# Patient Record
Sex: MALE | Race: WHITE | Employment: FULL TIME | URBAN - METROPOLITAN AREA
[De-identification: names, ages, dates, MRNs, and addresses within clinical notes are randomized per-mention and may not be internally consistent; named-entity substitution may affect disease eponyms.]

---

## 2020-07-07 ENCOUNTER — VIRTUAL VISIT (OUTPATIENT)
Dept: FAMILY MEDICINE CLINIC | Age: 35
End: 2020-07-07

## 2020-07-07 DIAGNOSIS — Z13.29 SCREENING FOR THYROID DISORDER: ICD-10-CM

## 2020-07-07 DIAGNOSIS — R35.0 FREQUENT URINATION: ICD-10-CM

## 2020-07-07 DIAGNOSIS — Z00.00 ENCOUNTER FOR MEDICAL EXAMINATION TO ESTABLISH CARE: Primary | ICD-10-CM

## 2020-07-07 DIAGNOSIS — N52.9 ERECTILE DYSFUNCTION, UNSPECIFIED ERECTILE DYSFUNCTION TYPE: ICD-10-CM

## 2020-07-07 DIAGNOSIS — Z13.220 SCREENING, LIPID: ICD-10-CM

## 2020-07-07 DIAGNOSIS — M51.36 BULGING LUMBAR DISC: ICD-10-CM

## 2020-07-07 NOTE — PROGRESS NOTES
Requested last consultation note. Labs faxed to [address] [place] [address]. [facility], confirmation received.

## 2020-07-07 NOTE — PROGRESS NOTES
Subjective:     Chief Complaint   Patient presents with   Tal Adrian is a 37 y.o. male who was seen by synchronous (real-time) audio-video technology on 7/7/2020 for Establish Care      Previous PCP-Christopher a long time ago. Says he usually does not go to the doctor, only goes when he has to. Works full time installing hardwood floors. Complains of erectile dysfunction for \"years, I dont think its ever really worked like it was suppose to but it really got bad over the past 5 years\". He says him and his wife were split up for about a year and now they are back together but he does not have any sexual desire, which he says has caused some problems in his marriage. Says that he has hard time getting and maintaining erection. No difficulty urinating. No loss of bladder control. No testicular pain. No penile discharge. Sometimes he feels that he urinates often. He says that he does have two children. He has never been seen for this ED, too embarrassed to discuss in the past.    He was seen by a urologist for testicular pain last year after he went to ER at [facility] and had imaging done, says that he went to the urologist who reviewed his imaging and told him that his testicular pain was due to his bulging discs in his back and told to go to neuro-surgeon or back specialist. Corey Sibley he never went to back doctor because he stopped heavy lifting and his groin pain resolved. He says that he was seen by [facility] but he also admits that he did not tell the urologist about his ongoing chronic ED. He denies history of STD  He denies back pain at this time. He notes that he did have colonoscopy about 3 years ago because he was having rectal bleeding. Diagnosed with internal and external hemorrhoids. Told to stop lifting heavy.           Patient Active Problem List    Diagnosis Date Noted    Erectile dysfunction 07/07/2020    Bulging lumbar disc 07/07/2020 Allergies   Allergen Reactions    Erythromycin Unknown (comments)    Pcn [Penicillins] Unknown (comments)     Past Medical History:   Diagnosis Date    Bulging lumbar disc 7/7/2020    Erectile dysfunction 7/7/2020     History reviewed. No pertinent surgical history.   Family History   Family history unknown: Yes     Social History     Tobacco Use    Smoking status: Current Every Day Smoker     Packs/day: 1.50     Years: 20.00     Pack years: 30.00     Types: Cigarettes    Smokeless tobacco: Never Used   Substance Use Topics    Alcohol use: Not Currently       ROS  Gen: no fatigue, fever, chills  Eyes: no excessive tearing, itching, or discharge  Nose: no rhinorrhea, no sinus pain  Mouth: no oral lesions, no sore throat  Resp: no shortness of breath, no wheezing, no cough  CV: no chest pain, no paroxysmal nocturnal dyspnea  Abd: no nausea, no heartburn, no diarrhea, no constipation, no abdominal pain  Neuro: no headaches, no syncope or presyncopal episodes  Endo: no polyuria, no polydipsia  Heme: no lymphadenopathy, no easy bruising or bleeding    Objective:     Patient-Reported Vitals 7/7/2020   Patient-Reported Weight 198 lbs   Patient-Reported Height 6 ft         [INSTRUCTIONS:  \"[x]\" Indicates a positive item  \"[]\" Indicates a negative item  -- DELETE ALL ITEMS NOT EXAMINED]    Constitutional: [x] Appears well-developed and well-nourished [x] No apparent distress          Mental status: [x] Alert and awake  [x] Oriented to person/place/time [x] Able to follow commands        Eyes:   EOM    [x]  Normal       Sclera  [x]  Normal              Discharge [x]  None visible       HENT: [x] Normocephalic, atraumatic     [x] Mouth/Throat: Mucous membranes are moist    External Ears [x] Normal      Neck: [x] No visualized mass     Pulmonary/Chest: [x] Respiratory effort normal   [x] No visualized signs of difficulty breathing or respiratory distress          Musculoskeletal:   [x] Normal gait with no signs of ataxia         [x] Normal range of motion of neck            Neurological:        [x] No Facial Asymmetry (Cranial nerve 7 motor function) (limited exam due to video visit)          [x] No gaze palsy           Skin:        [x] No significant exanthematous lesions or discoloration noted on facial skin               Psychiatric:       [x] Normal Affect        [x] No Hallucinations        Assessment & Plan:   Diagnoses and all orders for this visit:    1. Encounter for medical examination to establish care    2. Erectile dysfunction, unspecified erectile dysfunction type- will get labs. Consider referral to endocrine as well. Refer to neurosurgery. Will try to get records from [place] [facility] from last year appointment.   -     METABOLIC PANEL, COMPREHENSIVE; Future  -     CBC WITH AUTOMATED DIFF; Future  -     TSH 3RD GENERATION; Future  -     T4, FREE; Future  -     URINALYSIS W/ REFLEX CULTURE; Future  -     TESTOSTERONE, FREE & TOTAL; Future  -     CT/NG/T.VAGINALIS AMPLIFICATION; Future  -     REFERRAL TO NEUROSURGERY  -     HEMOGLOBIN A1C WITH EAG; Future  -     [address] AND ; Future    3. Bulging lumbar disc-no current pain but ED could be related to nerve issue.    -     REFERRAL TO NEUROSURGERY    4. Screening for thyroid disorder  -     TSH 3RD GENERATION; Future  -     T4, FREE; Future    5. Screening, lipid  -     LIPID PANEL; Future    6. Frequent urination- intermittent. Will try to get records from urology and will get UA with labs. -     HEMOGLOBIN A1C WITH EAG; Future    Smoking cessation discussed and encouraged. Follow up one month or sooner if needed. We discussed the expected course, resolution and complications of the diagnosis(es) in detail. Medication risks, benefits, costs, interactions, and alternatives were discussed as indicated. I advised him to contact the office if his condition worsens, changes or fails to improve as anticipated.  He expressed understanding with the diagnosis(es) and plan.       Kely Martínez, who was evaluated through a patient-initiated, synchronous (real-time) audio-video encounter, and/or his healthcare decision maker, is aware that it is a billable service, with coverage as determined by his insurance carrier. He provided verbal consent to proceed: Yes, and patient identification was verified. It was conducted pursuant to the emergency declaration under the [address] [place] [address] and the [employer]. A caregiver was present when appropriate. Ability to conduct physical exam was limited. I was at home. The patient was at home. Doxy. me used for this visit.     Teresita Laboy NP

## 2020-07-07 NOTE — PROGRESS NOTES
Chief Complaint   Patient presents with   [facility] Maintenance reviewed     1. Have you been to the ER, urgent care clinic since your last visit? Hospitalized since your last visit? No     2. Have you seen or consulted any other health care providers outside of the [address] [place] [address] since your last visit? Include any pap smears or colon screening.   No

## 2020-07-07 NOTE — Clinical Note
Can you fax lab orders to [address] please. Also- patient was seen at [facility] last year, does not remember name of MD he saw but can we try to get copy of that consultation please.   Thanks

## 2020-07-11 LAB
ALBUMIN SERPL-MCNC: 4.7 G/DL (ref 4–5)
ALBUMIN/GLOB SERPL: 2.1 {RATIO} (ref 1.2–2.2)
ALP SERPL-CCNC: 76 IU/L (ref 39–117)
ALT SERPL-CCNC: 23 IU/L (ref 0–44)
APPEARANCE UR: CLEAR
AST SERPL-CCNC: 29 IU/L (ref 0–40)
BACTERIA #/AREA URNS HPF: NORMAL /[HPF]
BASOPHILS # BLD AUTO: 0.1 X10E3/UL (ref 0–0.2)
BASOPHILS NFR BLD AUTO: 1 %
BILIRUB SERPL-MCNC: 0.4 MG/DL (ref 0–1.2)
BILIRUB UR QL STRIP: NEGATIVE
BUN SERPL-MCNC: 15 MG/DL (ref 6–20)
BUN/CREAT SERPL: 14 (ref 9–20)
C TRACH RRNA SPEC QL NAA+PROBE: NEGATIVE
CALCIUM SERPL-MCNC: 9.2 MG/DL (ref 8.7–10.2)
CASTS URNS QL MICRO: NORMAL /LPF
CHLORIDE SERPL-SCNC: 99 MMOL/L (ref 96–106)
CHOLEST SERPL-MCNC: 198 MG/DL (ref 100–199)
CO2 SERPL-SCNC: 26 MMOL/L (ref 20–29)
COLOR UR: YELLOW
CREAT SERPL-MCNC: 1.09 MG/DL (ref 0.76–1.27)
EOSINOPHIL # BLD AUTO: 0.4 X10E3/UL (ref 0–0.4)
EOSINOPHIL NFR BLD AUTO: 3 %
EPI CELLS #/AREA URNS HPF: NORMAL /HPF (ref 0–10)
ERYTHROCYTE [DISTWIDTH] IN BLOOD BY AUTOMATED COUNT: 12.2 % (ref 11.6–15.4)
EST. AVERAGE GLUCOSE BLD GHB EST-MCNC: 114 MG/DL
FSH SERPL-ACNC: 2.2 MIU/ML (ref 1.5–12.4)
GLOBULIN SER CALC-MCNC: 2.2 G/DL (ref 1.5–4.5)
GLUCOSE SERPL-MCNC: 91 MG/DL (ref 65–99)
GLUCOSE UR QL: NEGATIVE
HBA1C MFR BLD: 5.6 % (ref 4.8–5.6)
HCT VFR BLD AUTO: 47.1 % (ref 37.5–51)
HDLC SERPL-MCNC: 55 MG/DL
HGB BLD-MCNC: 15.9 G/DL (ref 13–17.7)
HGB UR QL STRIP: NEGATIVE
IMM GRANULOCYTES # BLD AUTO: 0.1 X10E3/UL (ref 0–0.1)
IMM GRANULOCYTES NFR BLD AUTO: 1 %
INTERPRETATION, 910389: NORMAL
KETONES UR QL STRIP: NEGATIVE
LDLC SERPL CALC-MCNC: 128 MG/DL (ref 0–99)
LEUKOCYTE ESTERASE UR QL STRIP: NEGATIVE
LH SERPL-ACNC: 7.5 MIU/ML (ref 1.7–8.6)
LYMPHOCYTES # BLD AUTO: 2.6 X10E3/UL (ref 0.7–3.1)
LYMPHOCYTES NFR BLD AUTO: 20 %
MCH RBC QN AUTO: 31.2 PG (ref 26.6–33)
MCHC RBC AUTO-ENTMCNC: 33.8 G/DL (ref 31.5–35.7)
MCV RBC AUTO: 93 FL (ref 79–97)
MICRO URNS: NORMAL
MICRO URNS: NORMAL
MONOCYTES # BLD AUTO: 1 X10E3/UL (ref 0.1–0.9)
MONOCYTES NFR BLD AUTO: 8 %
MUCOUS THREADS URNS QL MICRO: PRESENT
N GONORRHOEA RRNA SPEC QL NAA+PROBE: NEGATIVE
NEUTROPHILS # BLD AUTO: 8.5 X10E3/UL (ref 1.4–7)
NEUTROPHILS NFR BLD AUTO: 67 %
NITRITE UR QL STRIP: NEGATIVE
PH UR STRIP: 6.5 [PH] (ref 5–7.5)
PLATELET # BLD AUTO: 225 X10E3/UL (ref 150–450)
POTASSIUM SERPL-SCNC: 4.3 MMOL/L (ref 3.5–5.2)
PROT SERPL-MCNC: 6.9 G/DL (ref 6–8.5)
PROT UR QL STRIP: NEGATIVE
RBC # BLD AUTO: 5.09 X10E6/UL (ref 4.14–5.8)
RBC #/AREA URNS HPF: NORMAL /HPF (ref 0–2)
SODIUM SERPL-SCNC: 138 MMOL/L (ref 134–144)
SP GR UR: 1.02 (ref 1–1.03)
T VAGINALIS DNA SPEC QL NAA+PROBE: NEGATIVE
T4 FREE SERPL-MCNC: 1.13 NG/DL (ref 0.82–1.77)
TESTOST FREE SERPL-MCNC: 15.7 PG/ML (ref 8.7–25.1)
TESTOST SERPL-MCNC: 541 NG/DL (ref 264–916)
TRIGL SERPL-MCNC: 77 MG/DL (ref 0–149)
TSH SERPL DL<=0.005 MIU/L-ACNC: 1.72 UIU/ML (ref 0.45–4.5)
URINALYSIS REFLEX, 377202: NORMAL
UROBILINOGEN UR STRIP-MCNC: 0.2 MG/DL (ref 0.2–1)
VLDLC SERPL CALC-MCNC: 15 MG/DL (ref 5–40)
WBC # BLD AUTO: 12.6 X10E3/UL (ref 3.4–10.8)
WBC #/AREA URNS HPF: NORMAL /HPF (ref 0–5)

## 2020-07-14 ENCOUNTER — TELEPHONE (OUTPATIENT)
Dept: FAMILY MEDICINE CLINIC | Age: 35
End: 2020-07-14

## 2020-07-14 DIAGNOSIS — N52.9 ERECTILE DYSFUNCTION, UNSPECIFIED ERECTILE DYSFUNCTION TYPE: ICD-10-CM

## 2020-07-14 DIAGNOSIS — M51.36 BULGING LUMBAR DISC: Primary | ICD-10-CM

## 2020-07-14 NOTE — PROGRESS NOTES
verified. Notified of labs. Patient request Linnea Lee.  He wanted to let you know that he has contacted neurosurgery and he left them a message. I told him to give us a call back, if no reply.

## 2020-07-14 NOTE — PROGRESS NOTES
All labs look good except his WBC are a little elevated. Would like to repeat this in about a month to make sure his WBC do not remain elevated. Could be in response to a stressor, cold, virus but will recheck to make sure. Rest of his labs are all normal, slightly elevated cholesterol so needs to work on diet and exercise. Needs to see neurosurgery as we discussed. Ask which [facility] he would like to use.   Thank you

## 2020-07-14 NOTE — TELEPHONE ENCOUNTER
Patient has been trying to reach the neurosurgereon office for a couple of days and either had to leave voicemail or he has been on hold forever.

## 2020-07-15 NOTE — TELEPHONE ENCOUNTER
[facility] and they would like for pt to have a MRI done before scheduling appointment since pt has not had neck or back surgery.

## 2020-08-06 ENCOUNTER — OFFICE VISIT (OUTPATIENT)
Dept: FAMILY MEDICINE CLINIC | Age: 35
End: 2020-08-06
Payer: COMMERCIAL

## 2020-08-06 VITALS
BODY MASS INDEX: 24.22 KG/M2 | WEIGHT: 178.8 LBS | DIASTOLIC BLOOD PRESSURE: 78 MMHG | HEIGHT: 72 IN | SYSTOLIC BLOOD PRESSURE: 120 MMHG | OXYGEN SATURATION: 97 % | RESPIRATION RATE: 16 BRPM | TEMPERATURE: 97.2 F | HEART RATE: 83 BPM

## 2020-08-06 DIAGNOSIS — N52.9 ERECTILE DYSFUNCTION, UNSPECIFIED ERECTILE DYSFUNCTION TYPE: ICD-10-CM

## 2020-08-06 DIAGNOSIS — D72.829 LEUKOCYTOSIS, UNSPECIFIED TYPE: Primary | ICD-10-CM

## 2020-08-06 DIAGNOSIS — M51.36 BULGING LUMBAR DISC: ICD-10-CM

## 2020-08-06 PROCEDURE — 99214 OFFICE O/P EST MOD 30 MIN: CPT | Performed by: NURSE PRACTITIONER

## 2020-08-06 PROCEDURE — 99000 SPECIMEN HANDLING OFFICE-LAB: CPT | Performed by: NURSE PRACTITIONER

## 2020-08-06 PROCEDURE — 36415 COLL VENOUS BLD VENIPUNCTURE: CPT | Performed by: NURSE PRACTITIONER

## 2020-08-06 NOTE — PATIENT INSTRUCTIONS
Erectile Dysfunction: Care Instructions Your Care Instructions A man has erectile dysfunction (ED) when he routinely can't get or keep an erection that allows satisfactory sex. He may not be able to have an erection at any time. Or he may not be able to have one that is firm enough or lasts long enough to complete intercourse. ED is not the same as having trouble getting an erection now and then. That's common. It happens to most men at some time. ED can be caused by problems with the blood vessels, nerves, or hormones. It can be caused by diabetes, heart disease, and injuries. Nerve disorders, such as multiple sclerosis or Parkinson's disease, can also cause it. ED can also be caused by medicines, alcohol, and tobacco. Or it may be caused by depression, stress, grief, or relationship problems. Follow-up care is a key part of your treatment and safety. Be sure to make and go to all appointments, and call your doctor if you are having problems. It's also a good idea to know your test results and keep a list of the medicines you take. How can you care for yourself at home? Lifestyle · Limit alcohol. Have no more than 2 drinks a day. · Do not smoke. Smoking makes it harder for the blood vessels in the penis to relax and let blood flow in. If you need help quitting, talk to your doctor about stop-smoking programs and medicines. These can increase your chances of quitting for good. · Do not use cocaine, heroin, or other illegal drugs. · Try to reduce stress. · Give yourself time to adjust to change. Changes in your job, family, relationships, home life, and other areas can cause stress. And stress can cause erection problems. Work with your partner · Don't assume that you know what your partner likes when it comes to sex. You may be wrong. Talk about what each of you does and does not enjoy. · Make time outside of the bedroom to talk about your sex life.  If you avoid sex because you are afraid of having erection problems, your partner may worry that you are no longer interested. · If you and your partner have trouble talking about sex, see a therapist who can help you talk about it. Reading books with your partner about sexual health may also help. · Relax. Take time for more foreplay. Worrying about your erections may only make things worse. Medicines · Tell your doctor about all the medicines that you take. ? Some medicines can cause erection problems. ? Some medicines can have dangerous interactions with medicines that are prescribed for ED, including over-the-counter medicines and herbal products. · Be safe with medicines. Take your medicines exactly as prescribed. Call your doctor if you think you are having a problem with your medicine. · Talk to your doctor about trying a medicine to help you keep an erection. This could be a medicine such as Viagra, Levitra, or Cialis. If you have a heart problem, ask your doctor if these are safe for you. Do not take these medicines if you take nitroglycerin or other nitrate medicine. When should you call for help? Call your doctor now or seek immediate medical care if: 
· You took a medicine for erectile dysfunction and you have an erection that lasts longer than 3 hours. Watch closely for changes in your health, and be sure to contact your doctor if you have any problems. Where can you learn more? Go to http://www.gray.com/ Enter 052 [id number] [id number] in the search box to learn more about \"Erectile Dysfunction: Care Instructions. \" Current as of: February 11, 2020               Content Version: 12.5 © 6364-0391 Healthwise, Incorporated. Care instructions adapted under license by [employer] (which disclaims liability or warranty for this information).  If you have questions about a medical condition or this instruction, always ask your healthcare professional. [address] any warranty or liability for your use of this information. Learning About High White Blood Cell Counts What are white blood cells? White blood cells (leukocytes) help protect your body from infection. Normally, when germs get inside your body, your body makes more white blood cells that search for and destroy the germs. Less often, there are medical problems where the body may make a lot more white blood cells than it needs. What happens when you have a high white blood cell count? Your white blood cell count may be high because your body is fighting an infection. But other things can cause it, such as some medicines, burns, an illness, or other health problems. When your doctor sees that your white blood cell count is high, he or she will try to find out why, and then treat the cause. What are the symptoms? A high white blood cell count alone doesn't cause any symptoms. The symptoms you feel may come from the medical problem that your white blood cells are fighting. For example, if you have pneumonia, you may have a fever and trouble breathing. These are symptoms of pneumonia, not of a high white blood cell count. How is it treated? · Your doctor may do more tests to find the problem that's making your white blood cell count high. Once your doctor finds the problem, he or she may be able to treat it. · Part of your treatment may be telling your doctor if you feel worse. Watch your temperature, and call your doctor if your fever goes up and stays up. Follow-up care is a key part of your treatment and safety. Be sure to make and go to all appointments, and call your doctor if you are having problems. It's also a good idea to know your test results and keep a list of the medicines you take. Where can you learn more? Go to http://eyad-roberto.info/ Enter J963 in the search box to learn more about \"Learning About High White Blood Cell Counts. \" Current as of: December 9, 2019               Content Version: 12.5 © 3064-2742 Healthwise, Incorporated. Care instructions adapted under license by [employer] (which disclaims liability or warranty for this information). If you have questions about a medical condition or this instruction, always ask your healthcare professional. [place] [address] any warranty or liability for your use of this information.

## 2020-08-06 NOTE — PROGRESS NOTES
Subjective:     Chief Complaint   Patient presents with    Labs     FOLLOW UP        HPI:  38 y.o.  presents for follow up appointment. Still has not seen neurosurgery, says that he called them but there was some confusion about his insurance. Was told that he would be called back after they spoke to his insurance but he has not heard from them, that was three weeks ago. No new complaints. Feels fine, no pain. No hospital, ER or specialist visits since last primary care visit except as noted above. Past Medical History:   Diagnosis Date    Bulging lumbar disc 7/7/2020    Erectile dysfunction 7/7/2020       Social History     Tobacco Use    Smoking status: Current Every Day Smoker     Packs/day: 1.50     Years: 20.00     Pack years: 30.00     Types: Cigarettes    Smokeless tobacco: Never Used   Substance Use Topics    Alcohol use: Not Currently    Drug use: Never           Allergies   Allergen Reactions    Erythromycin Unknown (comments)    Pcn [Penicillins] Unknown (comments)       Health Maintenance reviewed       ROS:  Gen: no fatigue, no fever, no chills, no unexplained weight loss or weight gain  Eyes: no excessive tearing, itching, or discharge  Nose: no rhinorrhea, no sinus pain  Mouth: no oral lesions, no sore throat, no difficulty swallowing  Resp: no shortness of breath, no wheezing, no cough  CV: no chest pain, no orthopnea, no paroxysmal nocturnal dyspnea, no lower extremity edema, no palpitations  Abd: no nausea, no heartburn, no diarrhea, no constipation, no abdominal pain  Neuro: no headaches, no syncope or presyncopal episodes  Endo: no polyuria, no polydipsia.     : no hematuria, no dysuria, no frequency, no incontinence  Heme: no lymphadenopathy, no easy bruising or bleeding, no night sweats  MSK: no joint pain or swelling    PE:  Visit Vitals  /78   Pulse 83   Temp 97.2 °F (36.2 °C) (Temporal)   Resp 16   Ht 6' (1.829 m)   Wt 178 lb 12.8 oz (81.1 kg)   SpO2 97%   BMI 24.25 kg/m²     Gen: alert, oriented, no acute distress  Head: normocephalic, atraumatic  Ears: external auditory canals clear, TMs without erythema or effusion  Eyes: pupils equal round reactive to light, sclera clear, conjunctiva clear  Oral: moist mucus membranes, no oral lesions, no pharyngeal inflammation or exudate  Neck: symmetric normal sized thyroid, no carotid bruits, no jugular vein distention  Resp: no increase work of breathing, lungs clear to ausculation bilaterally, no wheezing, rales or rhonchi  CV: S1, S2 normal.  No murmurs, rubs, or gallops. Abd: soft, not tender, not distended. No hepatosplenomegaly. Normal bowel sounds. No hernias. No abdominal or renal bruits. Neuro: cranial nerves intact, normal strength and movement in all extremities, and sensation intact and symmetric. Skin: no lesion or rash  Extremities: no cyanosis or edema    No results found for this visit on 08/06/20. Assessment/Plan:  Differential diagnosis and treatment options reviewed with patient who is in agreement with treatment plan as outlined below. ICD-10-CM ICD-9-CM    1. Leukocytosis, unspecified type  D72.829 288.60 CBC WITH AUTOMATED DIFF      TX HANDLG&/OR CONVEY OF SPEC FOR TR OFFICE TO LAB      COLLECTION VENOUS BLOOD,VENIPUNCTURE   2. Erectile dysfunction, unspecified erectile dysfunction type  N52.9 607.84    3. Bulging lumbar disc  M51.26 722.10        Will repeat CBC today. He will call neurosurgery again. If they do not accept his insurance he may need to see different group, will call and let me know. Consider seeing urology again, he did not disclose that he had symptoms of ED when he was seen by him. All other labs look good. Discussed BMI and healthy weight. Encouraged patient to work to implement changes including diet high in raw fruits and vegetables, lean protein and good fats. Limit refined, processed carbohydrates and sugar. Encouraged regular exercise.  Recommended regular cardiovascular exercise 3-6 times per week for 30-60 minutes daily. I have discussed the diagnosis with the patient and the intended plan as seen in the above orders. The patient has received an after-visit summary and questions were answered concerning future plans. I have discussed medication side effects and warnings with the patient as well. The patient verbalizes understanding and agreement with the plan.

## 2020-08-06 NOTE — PROGRESS NOTES
1. Have you been to the ER, urgent care clinic since your last visit? Hospitalized since your last visit? No    2. Have you seen or consulted any other health care providers outside of the [address] [place] [address] since your last visit? Include any pap smears or colon screening.  No     Health Maintenance Due   Topic Date Due    Pneumococcal 0-64 years (1 of 1 - PPSV23) 07/12/1989    DTaP/Tdap/Td series (1 - Tdap) 07/12/2004    Influenza Age 9 to Adult  08/01/2020

## 2020-08-07 LAB
BASOPHILS # BLD AUTO: 0.1 X10E3/UL (ref 0–0.2)
BASOPHILS NFR BLD AUTO: 1 %
EOSINOPHIL # BLD AUTO: 0.5 X10E3/UL (ref 0–0.4)
EOSINOPHIL NFR BLD AUTO: 5 %
ERYTHROCYTE [DISTWIDTH] IN BLOOD BY AUTOMATED COUNT: 11.8 % (ref 11.6–15.4)
HCT VFR BLD AUTO: 49.6 % (ref 37.5–51)
HGB BLD-MCNC: 16.7 G/DL (ref 13–17.7)
IMM GRANULOCYTES # BLD AUTO: 0 X10E3/UL (ref 0–0.1)
IMM GRANULOCYTES NFR BLD AUTO: 0 %
LYMPHOCYTES # BLD AUTO: 2.2 X10E3/UL (ref 0.7–3.1)
LYMPHOCYTES NFR BLD AUTO: 22 %
MCH RBC QN AUTO: 30.1 PG (ref 26.6–33)
MCHC RBC AUTO-ENTMCNC: 33.7 G/DL (ref 31.5–35.7)
MCV RBC AUTO: 90 FL (ref 79–97)
MONOCYTES # BLD AUTO: 0.9 X10E3/UL (ref 0.1–0.9)
MONOCYTES NFR BLD AUTO: 10 %
NEUTROPHILS # BLD AUTO: 6.2 X10E3/UL (ref 1.4–7)
NEUTROPHILS NFR BLD AUTO: 62 %
PLATELET # BLD AUTO: 260 X10E3/UL (ref 150–450)
RBC # BLD AUTO: 5.54 X10E6/UL (ref 4.14–5.8)
WBC # BLD AUTO: 9.9 X10E3/UL (ref 3.4–10.8)

## 2020-08-24 ENCOUNTER — TELEPHONE (OUTPATIENT)
Dept: FAMILY MEDICINE CLINIC | Age: 35
End: 2020-08-24

## 2020-08-24 ENCOUNTER — HOSPITAL ENCOUNTER (OUTPATIENT)
Dept: MRI IMAGING | Age: 35
Discharge: HOME | End: 2020-08-24
Payer: COMMERCIAL

## 2020-08-24 DIAGNOSIS — M51.36 BULGING LUMBAR DISC: ICD-10-CM

## 2020-08-24 DIAGNOSIS — N52.9 ERECTILE DYSFUNCTION, UNSPECIFIED ERECTILE DYSFUNCTION TYPE: ICD-10-CM

## 2020-08-24 PROCEDURE — 72148 MRI LUMBAR SPINE W/O DYE: CPT | Performed by: NURSE PRACTITIONER

## 2020-08-27 ENCOUNTER — TELEPHONE (OUTPATIENT)
Dept: FAMILY MEDICINE CLINIC | Age: 35
End: 2020-08-27

## 2020-08-27 NOTE — PROGRESS NOTES
Has some disc bulging and some spinal stenosis. Should see neurosurgery as discussed. He should get a copy of his MRI on disc for them if needed.

## 2020-08-28 ENCOUNTER — TELEPHONE (OUTPATIENT)
Dept: FAMILY MEDICINE CLINIC | Age: 35
End: 2020-08-28

## 2020-08-28 NOTE — TELEPHONE ENCOUNTER
Patient calling to get mri results faxed to [facility] at [phone] attn [place]

## 2020-09-09 ENCOUNTER — TELEPHONE (OUTPATIENT)
Dept: FAMILY MEDICINE CLINIC | Age: 35
End: 2020-09-09

## 2020-09-09 NOTE — TELEPHONE ENCOUNTER
Jensen Hinton from [facility] needing labs and xrays faxed to [phone] she can be reached at [phone] xtn [phone]

## 2020-09-17 ENCOUNTER — TELEPHONE (OUTPATIENT)
Dept: FAMILY MEDICINE CLINIC | Age: 35
End: 2020-09-17

## 2020-09-21 NOTE — TELEPHONE ENCOUNTER
Please let patient know that I spoke to Dr Daina Corona (neurosurgeon). He called me right after his appointment with him last week and Dr Daina Corona discussed with me that he does not believe any of his complaints are related to his back. Can we schedule a virtual visit with me to discuss further?   I have openings on Friday and Today I think

## 2020-09-28 ENCOUNTER — VIRTUAL VISIT (OUTPATIENT)
Dept: FAMILY MEDICINE CLINIC | Age: 35
End: 2020-09-28

## 2020-09-28 DIAGNOSIS — Z91.199 NO-SHOW FOR APPOINTMENT: Primary | ICD-10-CM

## 2020-09-28 NOTE — PROGRESS NOTES
Chief Complaint   Patient presents with    Follow-up     pt states just a check in      Health Maintenance reviewed     1. Have you been to the ER, urgent care clinic since your last visit? Hospitalized since your last visit? No     2. Have you seen or consulted any other health care providers outside of the [address] [place] [address] since your last visit? Include any pap smears or colon screening.   No

## 2020-10-02 ENCOUNTER — VIRTUAL VISIT (OUTPATIENT)
Dept: FAMILY MEDICINE CLINIC | Age: 35
End: 2020-10-02
Payer: COMMERCIAL

## 2020-10-02 DIAGNOSIS — N52.8 OTHER MALE ERECTILE DYSFUNCTION: Primary | ICD-10-CM

## 2020-10-02 PROCEDURE — 99213 OFFICE O/P EST LOW 20 MIN: CPT | Performed by: NURSE PRACTITIONER

## 2020-10-02 RX ORDER — SILDENAFIL 50 MG/1
TABLET, FILM COATED ORAL
Qty: 30 TAB | Refills: 1 | Status: SHIPPED | OUTPATIENT
Start: 2020-10-02

## 2020-10-02 NOTE — PROGRESS NOTES
Maria Guadalupe Hewitt is a 38 y.o. male who was seen by synchronous (real-time) audio-video technology on 10/2/2020 for No chief complaint on file. Subjective:     Complains of ED persist, chronic. Was seen by neurosurgery and Neurosurgery says not related to his back at all [facility] this case with me). No pain or new deficits noted. Did labs on him and no abnormalities found. Patient Active Problem List    Diagnosis Date Noted    Erectile dysfunction 07/07/2020    Bulging lumbar disc 07/07/2020     Past Medical History:   Diagnosis Date    Bulging lumbar disc 7/7/2020    Erectile dysfunction 7/7/2020     No past surgical history on file.     ROS  Gen: no fatigue, fever, chills  Eyes: no excessive tearing, itching, or discharge  Nose: no rhinorrhea, no sinus pain  Mouth: no oral lesions, no sore throat  Resp: no shortness of breath, no wheezing, no cough  CV: no chest pain, no paroxysmal nocturnal dyspnea  Abd: no nausea, no heartburn, no diarrhea, no constipation, no abdominal pain  Neuro: no headaches, no syncope or presyncopal episodes  Endo: no polyuria, no polydipsia  Heme: no lymphadenopathy, no easy bruising or bleeding    Objective:     Patient-Reported Vitals 9/28/2020   Patient-Reported Weight 200 lbs   Patient-Reported Height -        [INSTRUCTIONS:  \"[x]\" Indicates a positive item  \"[]\" Indicates a negative item  -- DELETE ALL ITEMS NOT EXAMINED]    Constitutional: [x] Appears well-developed and well-nourished [x] No apparent distress      [] Abnormal -     Mental status: [x] Alert and awake  [x] Oriented to person/place/time [x] Able to follow commands    [] Abnormal -     Eyes:   EOM    [x]  Normal    [] Abnormal -   Sclera  [x]  Normal    [] Abnormal -          Discharge [x]  None visible   [] Abnormal -     HENT: [x] Normocephalic, atraumatic  [] Abnormal -   [x] Mouth/Throat: Mucous membranes are moist    External Ears [x] Normal  [] Abnormal -    Neck: [x] No visualized mass [] Abnormal -     Pulmonary/Chest: [x] Respiratory effort normal   [x] No visualized signs of difficulty breathing or respiratory distress        [] Abnormal -      Musculoskeletal:   [x] Normal gait with no signs of ataxia         [x] Normal range of motion of neck        [] Abnormal -     Neurological:        [x] No Facial Asymmetry (Cranial nerve 7 motor function) (limited exam due to video visit)          [x] No gaze palsy        [] Abnormal -          Skin:        [x] No significant exanthematous lesions or discoloration noted on facial skin         [] Abnormal -            Psychiatric:       [x] Normal Affect [] Abnormal -        [x] No Hallucinations          Assessment & Plan:   Diagnoses and all orders for this visit:    1. Other male erectile dysfunction  -     REFERRAL TO UROLOGY  -     sildenafil citrate (VIAGRA) 50 mg tablet; Take 1 pill as needed 1 hour prior to intercourse. Max dose 1 pill (50 mg) per 24 hours. Refer back to urology, will see different urologist this time. Trial viagra PRN for ED complaints at this time. Medication profile discussed with patient. We discussed the expected course, resolution and complications of the diagnosis(es) in detail. Medication risks, benefits, costs, interactions, and alternatives were discussed as indicated. I advised him to contact the office if his condition worsens, changes or fails to improve as anticipated. He expressed understanding with the diagnosis(es) and plan. Blanca Thomas, who was evaluated through a patient-initiated, synchronous (real-time) audio-video encounter, and/or his healthcare decision maker, is aware that it is a billable service, with coverage as determined by his insurance carrier. He provided verbal consent to proceed: Yes, and patient identification was verified.  It was conducted pursuant to the emergency declaration under the [address] [facility] [place], [address] waiver authority and the [employer] and [employer] General Act. A caregiver was present when appropriate. Ability to conduct physical exam was limited. I was at home. The patient was at home. Doxy. me used for this visit.     Rebeca Farias NP

## 2022-03-18 PROBLEM — N52.9 ERECTILE DYSFUNCTION: Status: ACTIVE | Noted: 2020-07-07

## 2022-03-19 PROBLEM — M51.36 BULGING LUMBAR DISC: Status: ACTIVE | Noted: 2020-07-07

## 2023-05-21 RX ORDER — SILDENAFIL 50 MG/1
TABLET, FILM COATED ORAL
COMMUNITY
Start: 2020-10-02

## 2024-12-27 NOTE — PROGRESS NOTES
- continue with conservative measures.  Instructed to take or Azelastine, Flonase, Xyzal regularly and contact me in 8 weeks with response  - we will start Singulair 10 mg nightly and an ICS to help with any bronchitis  - suspect this is a postviral phenomenon symptoms will resolve with a little bit of time   WBC normal on repeat.